# Patient Record
Sex: MALE | NOT HISPANIC OR LATINO | Employment: OTHER | URBAN - NONMETROPOLITAN AREA
[De-identification: names, ages, dates, MRNs, and addresses within clinical notes are randomized per-mention and may not be internally consistent; named-entity substitution may affect disease eponyms.]

---

## 2019-11-19 ENCOUNTER — OFFICE VISIT (OUTPATIENT)
Dept: ORTHOPEDIC SURGERY | Facility: CLINIC | Age: 44
End: 2019-11-19

## 2019-11-19 VITALS — TEMPERATURE: 98.2 F | BODY MASS INDEX: 39.94 KG/M2 | HEIGHT: 70 IN | WEIGHT: 279 LBS

## 2019-11-19 DIAGNOSIS — M25.511 ACUTE PAIN OF RIGHT SHOULDER: Primary | ICD-10-CM

## 2019-11-19 PROCEDURE — 73030 X-RAY EXAM OF SHOULDER: CPT | Performed by: PHYSICIAN ASSISTANT

## 2019-11-19 PROCEDURE — 99204 OFFICE O/P NEW MOD 45 MIN: CPT | Performed by: PHYSICIAN ASSISTANT

## 2019-11-26 PROBLEM — M25.511 ACUTE PAIN OF RIGHT SHOULDER: Status: ACTIVE | Noted: 2019-11-26

## 2019-11-26 NOTE — PROGRESS NOTES
NEW VISIT    Patient: Chepe Rivera  ?  YOB: 1975    MRN: [id number]  ?  Chief Complaint   Patient presents with   • Right Shoulder - Pain      ?  HPI:   Everett is a 44-year-old male patient who presents with pain in the right shoulder for 4 weeks.  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  He denies any specific injury to the shoulder but states it is not improved but only worsened over the last several weeks.  His biggest complaint is that he has difficulty lifting the arm beyond shoulder height as a result of the pain.  He has undergone a musculoskeletal ultrasound of the shoulder which reveals a partial tear of the long head biceps tendon, findings suggestive of partial tears of the subscapularis and infraspinatus tendons and findings to suggest arthritis changes of the glenohumeral joint.  Pain Location: right shoulder(s)  Radiation: none  Quality: aching  Intensity/Severity: moderate  Duration: 1 month(s)  Onset quality: sudden  Timing: intermittent  Aggravating Factors: reaching forward, Reaching overhead  Alleviating Factors: rest  Previous Episodes: none mentioned  Associated Symptoms: pain, decreased ROM, decreased strength  ADLs Affected: grooming/hygiene/toileting/personal care, work related activities, recreational activities/sports  Previous Treatment: He has been using ibuprofen and ice without much improvement    This patient is a new patient.  This problem is new to this examiner.      Allergies: No Known Allergies    Medications:   Home Medications:  No current outpatient medications on file prior to visit.     No current facility-administered medications on file prior to visit.      Current Medications:  Scheduled Meds:  PRN Meds:.    I have reviewed the patient's medical history in detail and updated the computerized patient record.  Review and summarization of old records include:    Past Medical History:   Diagnosis Date   • Fracture,  "femur (CMS/[facility]) 1996    left femur fracture    • Fracture, tibia and fibula 1996    right tib/fib fracture     Past Surgical History:   Procedure Laterality Date   • FEMUR FRACTURE SURGERY     • ORIF TIBIA/FIBULA FRACTURES       Social History     Occupational History   • Not on file   Tobacco Use   • Smoking status: Never Smoker   Substance and Sexual Activity   • Alcohol use: No     Frequency: Never   • Drug use: Not on file   • Sexual activity: Not on file      Social History     Social History Narrative   • Not on file     Family History   Problem Relation Age of Onset   • Hypertension Other          Review of Systems  Constitutional: Negative.    HENT: Negative.    Eyes: Negative.    Respiratory: Negative.    Cardiovascular: Negative.    Endocrine: Negative.    Musculoskeletal: Positive for arthralgias, decreased ROM, decreased strength.  Skin: Negative.    Allergic/Immunologic: Negative.    Neurological: Negative.    Hematological: Negative.    Psychiatric/Behavioral: Negative.           Wt Readings from Last 3 Encounters:   11/19/19 127 kg (279 lb)     Ht Readings from Last 3 Encounters:   11/19/19 177.8 cm (70\")     Body mass index is 40.03 kg/m².  Facility age limit for growth percentiles is 20 years.  Vitals:    11/19/19 0914   Temp: 98.2 °F (36.8 °C)         Physical Exam  Constitutional: Patient is oriented to person, place, and time. Appears well-developed and well-nourished.   HENT:   Head: Normocephalic and atraumatic.   Eyes: Conjunctivae and EOM are normal. Pupils are equal, round, and reactive to light.   Cardiovascular: Normal rate and intact distal pulses.   Pulmonary/Chest: Effort normal and breath sounds normal.   Musculoskeletal:   See detailed exam below   Neurological: Alert and oriented to person, place, and time. No sensory deficit. Coordination normal.   Skin: Skin is warm and dry. Capillary refill takes less than 2 seconds. No rash noted. No erythema.   Psychiatric: Patient has a normal " mood and affect. His behavior is normal. Judgment and thought content normal.   Nursing note and vitals reviewed.      Ortho Exam:   Right shoulder:   There is tenderness anteriorly with palpation as well as tenderness over insertion of the rotator cuff of the greater tuberosity of humerus.    Slight proximal migration of the humeral head is noted. AC joint is tender. Crossover adduction test is positive. Drop arm sign is negative.   Forward flexion is 0-120 degrees, abduction is 0-120 degrees, external rotation is 0-30 degrees. Patient has mild atrophy both of the supra and infraspinatus fossa. Sulcus sign is negative. There is no evidence of multidirectional instability. Neer test is positive on compression. Skin and soft tissue tenderness is noted. Patient's strength in abduction and external rotation are lacking. The pain level is 6.      Diagnostics:  X-Ray Report:  Right shoulder(s) X-Ray  Indication: Evaluation of right shoulder pain  AP, Lateral views  Findings: There is mild proximal migration of the humeral head, moderate narrowing of the glenohumeral space, as well as some evidence of sclerosis of the glenoid.  Bony lesion: no  Soft tissues: within normal limits  Joint spaces: decreased  Hardware appropriately positioned: not applicable  Prior studies available for comparison: yes a musculoskeletal ultrasound from 11/11/2019  X-RAY was ordered and reviewed by Moiz Jones PA-C    Reviewed report from outside testing-musculoskeletal ultrasound imaging of the right shoulder, performed at [place] [facility]:  · There is evidence of a long head biceps tendon tear, subscapularis tendon tear, supraspinatus tendon tear, and infraspinatus tendon tear.  It is also noted there are arthritic changes of the glenohumeral joint.       Assessment:  Chepe was seen today for pain.    Diagnoses and all orders for this visit:    Acute pain of right shoulder  -     XR Shoulder 2+ View Right    Other orders  -      SCANNED - IMAGING      ?    Plan    · Discussed in depth with patient his physical exam findings, symptoms, results from ultrasound and how to best proceed with treatment.  I gave patient the option to request further imaging with an MRI order to try steroid injections to help alleviate the pain for the time.  Patient would like to proceed with an MRI of the shoulder in order to find out what is going on and be able to resume his normal lifestyle.  · Rest, ice, compression, and elevation (RICE) therapy  · Stretching and strengthening exercises  · Alternate OTC Ibuprofen and Tylenol as needed/if clinically indicated  · Follow up once MRI is complete     Date of encounter: 11/19/2019   Moiz Jones PA-C    Electronically signed by Moiz Jones PA-C, 11/26/19, 6:29 PM.

## 2019-12-10 ENCOUNTER — TELEPHONE (OUTPATIENT)
Dept: ORTHOPEDIC SURGERY | Facility: CLINIC | Age: 44
End: 2019-12-10

## 2019-12-10 NOTE — TELEPHONE ENCOUNTER
LEFT MESSAGE FOR THE PATIENT EXPLAINING THAT [facility] HAD DENIED HIS MRI FOR THE RIGHT SHOULDER. WHAT DO YOU ADVISE FOR THE PATIENT AT THIS TIME? STEROID INJECTION, P.T.?? PLEASE ADVISE/RJ

## 2019-12-11 NOTE — TELEPHONE ENCOUNTER
Yes, I do believe an injection would be our next step.  I believe he is already participated in some physical therapy.

## 2019-12-13 DIAGNOSIS — M25.511 ACUTE PAIN OF RIGHT SHOULDER: Primary | ICD-10-CM

## 2019-12-13 RX ORDER — PREDNISONE 20 MG/1
TABLET ORAL
Qty: 9 TABLET | Refills: 0 | Status: SHIPPED | OUTPATIENT
Start: 2019-12-13 | End: 2020-09-18

## 2019-12-13 NOTE — TELEPHONE ENCOUNTER
Patients wife called asking if the patient could have a oral steroid, he is unable to come in before the holidays for a steroid injection due to his work load at the moment.     Please Advise.

## 2019-12-16 ENCOUNTER — CLINICAL SUPPORT (OUTPATIENT)
Dept: ORTHOPEDIC SURGERY | Facility: CLINIC | Age: 44
End: 2019-12-16

## 2019-12-16 DIAGNOSIS — M25.511 ACUTE PAIN OF RIGHT SHOULDER: Primary | ICD-10-CM

## 2019-12-16 PROCEDURE — 20610 DRAIN/INJ JOINT/BURSA W/O US: CPT | Performed by: PHYSICIAN ASSISTANT

## 2019-12-16 RX ADMIN — LIDOCAINE HYDROCHLORIDE 2 ML: 10 INJECTION, SOLUTION EPIDURAL; INFILTRATION; INTRACAUDAL; PERINEURAL at 15:39

## 2019-12-16 RX ADMIN — METHYLPREDNISOLONE ACETATE 160 MG: 80 INJECTION, SUSPENSION INTRA-ARTICULAR; INTRALESIONAL; INTRAMUSCULAR; SOFT TISSUE at 15:39

## 2019-12-16 NOTE — PROGRESS NOTES
Large Joint Arthrocentesis: R glenohumeral  Date/Time: 12/16/2019 3:39 PM  Consent given by: patient  Site marked: site marked  Timeout: Immediately prior to procedure a time out was called to verify the correct patient, procedure, equipment, support staff and site/side marked as required   Supporting Documentation  Indications: pain   Procedure Details  Location: shoulder - R glenohumeral  Preparation: Patient was prepped and draped in the usual sterile fashion  Needle size: 25 G  Approach: anteromedial  Medications administered: 160 mg methylPREDNISolone acetate 80 MG/ML; 2 mL lidocaine PF 1% 1 %  Patient tolerance: patient tolerated the procedure well with no immediate complications        Electronically signed by Moiz Jones PA-C, 12/20/19, 8:08 AM.

## 2019-12-20 RX ORDER — LIDOCAINE HYDROCHLORIDE 10 MG/ML
2 INJECTION, SOLUTION EPIDURAL; INFILTRATION; INTRACAUDAL; PERINEURAL
Status: COMPLETED | OUTPATIENT
Start: 2019-12-16 | End: 2019-12-16

## 2019-12-20 RX ORDER — METHYLPREDNISOLONE ACETATE 80 MG/ML
160 INJECTION, SUSPENSION INTRA-ARTICULAR; INTRALESIONAL; INTRAMUSCULAR; SOFT TISSUE
Status: COMPLETED | OUTPATIENT
Start: 2019-12-16 | End: 2019-12-16

## 2019-12-20 NOTE — PROGRESS NOTES
INJECTION    Patient: Chepe Rivera    YOB: 1975    MRN: [id number]    Chief Complaint   Patient presents with   • Right Shoulder - Follow-up, Pain       History of Present Illness: Patient is seen in the office today with complaint of right shoulder pain. The pain is intermittent and is worsened by overhead motion and cross body activities. It has been progressive in nature but remains intermittent . Has not had improvement in the past with ice, heat and NSAIDS.  At previous visit I had ordered an MRI of the shoulder but insurance has denied that, therefore patient is being seen today for a trial of steroid injection.    This problem is not new to this examiner.     Allergies: No Known Allergies    Medications:   Home Medications:  Current Outpatient Medications on File Prior to Visit   Medication Sig   • predniSONE (DELTASONE) 20 MG tablet One tablet twice daily for 3 days, then one tablet once daily for 3 days     No current facility-administered medications on file prior to visit.      Current Medications:  Scheduled Meds:  PRN Meds:.    I have reviewed the patient's medical history in detail and updated the computerized patient record.  Review and summarization of old records include:    Past Medical History:   Diagnosis Date   • Fracture, femur (CMS/HCC) 1996    left femur fracture    • Fracture, tibia and fibula 1996    right tib/fib fracture     Past Surgical History:   Procedure Laterality Date   • FEMUR FRACTURE SURGERY     • ORIF TIBIA/FIBULA FRACTURES       Social History     Occupational History   • Not on file   Tobacco Use   • Smoking status: Never Smoker   Substance and Sexual Activity   • Alcohol use: No     Frequency: Never   • Drug use: Not on file   • Sexual activity: Not on file      Social History     Social History Narrative   • Not on file     Family History   Problem Relation Age of Onset   • Hypertension Other        Review of Systems:  Constitutional: Negative.   "  Eyes: Negative.    Respiratory: Negative.    Cardiovascular: Negative.    Endocrine: Negative.    Musculoskeletal: Positive for joint pain, decreased ROM, decreased strength.   Skin: Negative.    Allergic/Immunologic: Negative.    Neurological: Negative.    Hematological: Negative.    Psychiatric/Behavioral: Negative.            Wt Readings from Last 3 Encounters:   11/19/19 127 kg (279 lb)     Ht Readings from Last 3 Encounters:   11/19/19 177.8 cm (70\")     There is no height or weight on file to calculate BMI.  No height and weight on file for this encounter.  There were no vitals filed for this visit.    Physical Exam:  Constitutional: Patient is oriented to person, place, and time. Appears well-developed and well-nourished.   HENT:   Head: Normocephalic and atraumatic.   Eyes: Conjunctivae and EOM are normal. Pupils are equal, round, and reactive to light.   Cardiovascular: Normal rate and intact distal pulses.   Pulmonary/Chest: Effort normal and breath sounds normal.   Musculoskeletal:   See detailed exam below   Neurological: Alert and oriented to person, place, and time. No sensory deficit. Coordination normal.   Skin: Skin is warm and dry. Capillary refill takes less than 2 seconds. No rash noted. No erythema.   Psychiatric: Patient has a normal mood and affect. His behavior is normal. Judgment and thought content normal.   Nursing note and vitals reviewed.      Musculoskeletal Exam:    Right shoulder:  There is tenderness anteriorly with palpation as well as tenderness over insertion of the rotator cuff of the greater tuberosity of humerus.    Slight proximal migration of the humeral head is noted. AC joint is tender. Crossover adduction test is positive. Drop arm sign is negative.   Forward flexion is 0-120 degrees, abduction is 0-120 degrees, external rotation is 0-30 degrees. Patient has mild atrophy both of the supra and infraspinatus fossa. Sulcus sign is negative. There is no evidence of " multidirectional instability. Neer test is positive on compression. Skin and soft tissue tenderness is noted. Patient's strength in abduction and external rotation are lacking. The pain level is 6.    Exam is historical but has been reviewed with no changes made    Diagnostics:  no diagnostic testing performed this visit    Procedure:  See separate procedure note      Assessment:   Chepe was seen today for follow-up and pain.    Diagnoses and all orders for this visit:    Acute pain of right shoulder  -     Large Joint Arthrocentesis: R glenohumeral          Plan:   · Injected patient's right shoulder joint(s)with Steroid from an posterior approach   · Rest, ice, compression, and elevation (RICE) therapy  · OTC Alternate Ibuprofen and Tylenol as needed  · We will reattempt approval for MRI of the right shoulder in early January if no improvement with steroid injection    Date of encounter: 12/16/2019   Moiz Jones PA-C    Electronically signed by Moiz Jones PA-C, 12/20/19, 8:09 AM.

## 2020-02-04 DIAGNOSIS — M25.511 RIGHT SHOULDER PAIN, UNSPECIFIED CHRONICITY: Primary | ICD-10-CM

## 2020-07-06 DIAGNOSIS — M25.511 RIGHT SHOULDER PAIN, UNSPECIFIED CHRONICITY: Primary | ICD-10-CM

## 2020-07-29 ENCOUNTER — HOSPITAL ENCOUNTER (OUTPATIENT)
Dept: OTHER | Facility: HOSPITAL | Age: 45
Discharge: HOME | End: 2020-07-29
Attending: PHYSICIAN ASSISTANT

## 2020-07-29 DIAGNOSIS — M25.512 ACUTE PAIN OF LEFT SHOULDER: Primary | ICD-10-CM

## 2020-08-04 ENCOUNTER — TELEMEDICINE (OUTPATIENT)
Dept: ORTHOPEDIC SURGERY | Facility: CLINIC | Age: 45
End: 2020-08-04

## 2020-08-04 DIAGNOSIS — M75.102 TEAR OF LEFT SUPRASPINATUS TENDON: Primary | ICD-10-CM

## 2020-08-04 DIAGNOSIS — M25.512 ACUTE PAIN OF LEFT SHOULDER: ICD-10-CM

## 2020-08-04 DIAGNOSIS — S49.92XA SHOULDER INJURY, LEFT, INITIAL ENCOUNTER: ICD-10-CM

## 2020-08-04 PROCEDURE — 99214 OFFICE O/P EST MOD 30 MIN: CPT | Performed by: PHYSICIAN ASSISTANT

## 2020-08-12 ENCOUNTER — TELEPHONE (OUTPATIENT)
Dept: ORTHOPEDIC SURGERY | Facility: CLINIC | Age: 45
End: 2020-08-12

## 2020-08-12 ENCOUNTER — CLINICAL SUPPORT (OUTPATIENT)
Dept: ORTHOPEDIC SURGERY | Facility: CLINIC | Age: 45
End: 2020-08-12

## 2020-08-12 VITALS — TEMPERATURE: 97.5 F | WEIGHT: 275 LBS | HEIGHT: 71 IN | BODY MASS INDEX: 38.5 KG/M2

## 2020-08-12 DIAGNOSIS — M75.102 TEAR OF LEFT SUPRASPINATUS TENDON: Primary | ICD-10-CM

## 2020-08-12 DIAGNOSIS — M25.512 ACUTE PAIN OF LEFT SHOULDER: ICD-10-CM

## 2020-08-12 PROCEDURE — 20610 DRAIN/INJ JOINT/BURSA W/O US: CPT | Performed by: PHYSICIAN ASSISTANT

## 2020-08-12 RX ADMIN — METHYLPREDNISOLONE ACETATE 160 MG: 80 INJECTION, SUSPENSION INTRA-ARTICULAR; INTRALESIONAL; INTRAMUSCULAR; SOFT TISSUE at 15:07

## 2020-08-12 RX ADMIN — LIDOCAINE HYDROCHLORIDE 2 ML: 10 INJECTION, SOLUTION INFILTRATION; PERINEURAL at 15:07

## 2020-08-12 NOTE — PROGRESS NOTES
Procedure   Large Joint Arthrocentesis: L glenohumeral  Date/Time: 8/12/2020 3:07 PM  Consent given by: patient  Site marked: site marked  Timeout: Immediately prior to procedure a time out was called to verify the correct patient, procedure, equipment, support staff and site/side marked as required   Supporting Documentation  Indications: pain   Procedure Details  Location: shoulder - L glenohumeral  Preparation: Patient was prepped and draped in the usual sterile fashion  Needle size: 25 G  Approach: posterior  Medications administered: 2 mL lidocaine 1 %; 160 mg methylPREDNISolone acetate 80 MG/ML  Patient tolerance: patient tolerated the procedure well with no immediate complications      Electronically signed by Moiz Jones PA-C, 08/16/20, 10:30 PM.

## 2020-08-16 DIAGNOSIS — M75.102 TEAR OF LEFT SUPRASPINATUS TENDON: Primary | ICD-10-CM

## 2020-08-16 DIAGNOSIS — S46.812A PARTIAL TEAR OF LEFT SUBSCAPULARIS TENDON, INITIAL ENCOUNTER: ICD-10-CM

## 2020-08-16 PROBLEM — S49.92XA SHOULDER INJURY, LEFT, INITIAL ENCOUNTER: Status: ACTIVE | Noted: 2020-08-16

## 2020-08-16 PROBLEM — M25.512 ACUTE PAIN OF LEFT SHOULDER: Status: ACTIVE | Noted: 2020-08-16

## 2020-08-16 RX ORDER — LIDOCAINE HYDROCHLORIDE 10 MG/ML
2 INJECTION, SOLUTION INFILTRATION; PERINEURAL
Status: COMPLETED | OUTPATIENT
Start: 2020-08-12 | End: 2020-08-12

## 2020-08-16 RX ORDER — METHYLPREDNISOLONE ACETATE 80 MG/ML
160 INJECTION, SUSPENSION INTRA-ARTICULAR; INTRALESIONAL; INTRAMUSCULAR; SOFT TISSUE
Status: COMPLETED | OUTPATIENT
Start: 2020-08-12 | End: 2020-08-12
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
## 2020-08-17 NOTE — PROGRESS NOTES
INJECTION      Patient: Chepe Rivera    MRN: [id number]    YOB: 1975    Chief Complaint   Patient presents with   • Left Shoulder - Follow-up, Pain   • Injections     LEFT SHOULDER         History of Present Illness:  Chepe Rivera is here today for injection therapy. He is receiving first time  left shoulder injections of steroid. His initial visit for this complaint was performed via video visit, when we discussed treatment options for a full thickness partial tearing of the supraspinatus and subscapularis tendons. He would like to try a steroid injection for pain relief and to see if he can prolong surgery for a little while. Treatment options have been discussed and he understands and consents.       Physical Exam:   44 y.o. male awake, alert, oriented, in no acute distress and well developed, well nourished.  Positive for significant tenderness at the anterior aspect of the shoulder and at the insertion of the rotator cuff over the greater tuberosity of the humerus.   Positive for tenderness with palpation of the AC joint.  Soft tissue tenderness is noted.   Slight proximal migration of the humeral head is noted.   Forward flexion is 0-90 degrees, abduction is 0-90 degrees, external rotation is 0-20 degrees.   Positive for decreased strength in abduction and external rotation.   Crossover adduction test is positive.    Drop arm sign is positive for pain.  Sulcus sign is negative.    Neer test is positive on compression.  The pain level is 7.  There is no evidence of multidirectional instability.       Procedures  See separate procedure note  Injection site was identified by physical examination and cleaned with Betadine and alcohol swabs. Prior to needle insertion, ethyl chloride spray was used for surface anesthesia. Sterile technique was used.       ASSESSMENT:  Chepe was seen today for injections, follow-up and pain.    Diagnoses and all orders for this visit:    Chronic left shoulder  pain  -     Large Joint Arthrocentesis: L glenohumeral          PLAN:   • Left shoulder steroid injection was discussed with the patient. Discussed indication, risks, benefits, and alternatives. Verbal consent was given to proceed with the procedure.   • Injection was performed from posterior approach.  Patient tolerated the procedure well and no complications were encountered.  • Discussion of orthopedic goals and activities and patient/guardian expressed understanding.  • Ice, heat, rest, compression and elevation of extremity as beneficial  • nsaids and/or tylenol as beneficial  • Instructed to refrain from heavy activity/rest the extremity for the next 24-48 hours  • Discussion regarding possibility of cortisol flare and what to expect if this occurs  • Watch for signs and symptoms of infection  • Call if adverse effect from injection therapy  • He will discuss possible surgery with his wife and let me know what he would like to do.      Moiz Jones PA-C  Encounter Date: 8/12/2020    Electronically signed by Moiz Jones PA-C, 08/16/20, 10:31 PM.      EMR Dragon/Transcription disclaimer:  Much of this encounter note is an electronic transcription/translation of spoken language to printed text. The electronic translation of spoken language may permit erroneous, or at times, nonsensical words or phrases to be inadvertently transcribed; Although I have reviewed the note for such errors, some may still exist.

## 2020-09-14 ENCOUNTER — OUTSIDE FACILITY SERVICE (OUTPATIENT)
Dept: ORTHOPEDIC SURGERY | Facility: CLINIC | Age: 45
End: 2020-09-14

## 2020-09-14 PROCEDURE — 29823 SHO ARTHRS SRG XTNSV DBRDMT: CPT | Performed by: ORTHOPAEDIC SURGERY

## 2020-09-14 PROCEDURE — 29824 SHO ARTHRS SRG DSTL CLAVICLC: CPT | Performed by: ORTHOPAEDIC SURGERY

## 2020-09-14 PROCEDURE — 29826 SHO ARTHRS SRG DECOMPRESSION: CPT | Performed by: ORTHOPAEDIC SURGERY

## 2020-09-18 ENCOUNTER — OFFICE VISIT (OUTPATIENT)
Dept: ORTHOPEDIC SURGERY | Facility: CLINIC | Age: 45
End: 2020-09-18

## 2020-09-18 VITALS — HEIGHT: 71 IN | TEMPERATURE: 97.5 F | WEIGHT: 280 LBS | BODY MASS INDEX: 39.2 KG/M2

## 2020-09-18 DIAGNOSIS — M75.102 TEAR OF LEFT SUPRASPINATUS TENDON: ICD-10-CM

## 2020-09-18 DIAGNOSIS — M25.512 ACUTE PAIN OF LEFT SHOULDER: Primary | ICD-10-CM

## 2020-09-18 PROCEDURE — 99024 POSTOP FOLLOW-UP VISIT: CPT | Performed by: PHYSICIAN ASSISTANT

## 2020-09-18 RX ORDER — OXYCODONE AND ACETAMINOPHEN 7.5; 325 MG/1; MG/1
1 TABLET ORAL EVERY 4 HOURS PRN
COMMUNITY
Start: 2020-09-14

## 2020-09-27 NOTE — PROGRESS NOTES
OTHER POST OP GLOBAL     NAME: Chepe Rivera  ?  : 1975  ?  MRN: [id number]    Chief Complaint   Patient presents with   • Left Shoulder - Follow-up, Pain   • Post-op     ?  Date of surgery: 2020    HPI:   Patient returns today for 4 day follow up of left shoulder arthroscopy. Arthroscopic portals healing nicely/as expected with no signs of infection.  Originally he was thought to have had a rotator cuff tear although he ended up having a mass-effect on the rotator cuff tear secondary to the AC joint.  A Job resection was carried out.  Patient reports doing well with no unusual complaints. Appears to be progressing appropriately.      Review of Systems:      Ortho Exam:   Left shoulder   Patient is postoperative shoulder arthroscopy 4 day(s).    Arthroscopic portals are clean and healing nicely. Forearm is soft and nontender.   There is no evidence of a DVT or compartmental syndrome syndrome.   Axillary nerve function is well preserved.   Skin and soft tissues are consistent with postoperative healing status.   Forward flexion is 0-110 degrees, active abduction is 0-90 degrees.      Diagnostic Studies:  no diagnostic testing performed this visit      Assessment:  Chepe was seen today for post-op, follow-up and pain.    Diagnoses and all orders for this visit:    Acute pain of left shoulder  -     Ambulatory Referral to Physical Therapy Evaluate and treat (s/p job resection, mass effect on supraspinatus), POST OP    Tear of left supraspinatus tendon  -     Ambulatory Referral to Physical Therapy Evaluate and treat (s/p job resection, mass effect on supraspinatus), POST OP          Plan   • Incision care  • Continue ice as needed  • Gentle active ROM  • Stretching and strengthening exercises  • Alternate Ibuprofen and Tylenol as needed  • Fall precautions  • Follow up in 4-6 week(s)     Date of encounter: 2020  Moiz Jones PA-C    Electronically signed by Moiz Jones  BAYLEE, 09/26/20, 10:50 PM EDT.    EMR Dragon/Transcription disclaimer:  Much of this encounter note is an electronic transcription/translation of spoken language to printed text. The electronic translation of spoken language may permit erroneous, or at times, nonsensical words or phrases to be inadvertently transcribed; Although I have reviewed the note for such errors, some may still exist.